# Patient Record
Sex: FEMALE | NOT HISPANIC OR LATINO | Employment: FULL TIME | URBAN - METROPOLITAN AREA
[De-identification: names, ages, dates, MRNs, and addresses within clinical notes are randomized per-mention and may not be internally consistent; named-entity substitution may affect disease eponyms.]

---

## 2023-12-30 DIAGNOSIS — Z30.41 ENCOUNTER FOR SURVEILLANCE OF CONTRACEPTIVE PILLS: Primary | ICD-10-CM

## 2024-01-02 RX ORDER — NORETHINDRONE ACETATE/ETHINYL ESTRADIOL 1MG-20MCG
1 KIT ORAL DAILY
Qty: 84 TABLET | Refills: 0 | Status: SHIPPED | OUTPATIENT
Start: 2024-01-02 | End: 2024-02-13 | Stop reason: SDUPTHER

## 2024-02-06 ENCOUNTER — APPOINTMENT (OUTPATIENT)
Dept: OBSTETRICS AND GYNECOLOGY | Facility: CLINIC | Age: 34
End: 2024-02-06
Payer: COMMERCIAL

## 2024-02-13 ENCOUNTER — OFFICE VISIT (OUTPATIENT)
Dept: OBSTETRICS AND GYNECOLOGY | Facility: CLINIC | Age: 34
End: 2024-02-13
Payer: COMMERCIAL

## 2024-02-13 VITALS
BODY MASS INDEX: 26.05 KG/M2 | HEIGHT: 63 IN | DIASTOLIC BLOOD PRESSURE: 92 MMHG | WEIGHT: 147 LBS | SYSTOLIC BLOOD PRESSURE: 128 MMHG

## 2024-02-13 DIAGNOSIS — Z01.419 WELL WOMAN EXAM WITH ROUTINE GYNECOLOGICAL EXAM: ICD-10-CM

## 2024-02-13 DIAGNOSIS — Z30.41 ENCOUNTER FOR SURVEILLANCE OF CONTRACEPTIVE PILLS: ICD-10-CM

## 2024-02-13 PROCEDURE — 88175 CYTOPATH C/V AUTO FLUID REDO: CPT

## 2024-02-13 PROCEDURE — 99395 PREV VISIT EST AGE 18-39: CPT | Performed by: OBSTETRICS & GYNECOLOGY

## 2024-02-13 PROCEDURE — 1036F TOBACCO NON-USER: CPT | Performed by: OBSTETRICS & GYNECOLOGY

## 2024-02-13 RX ORDER — NORETHINDRONE ACETATE AND ETHINYL ESTRADIOL .02; 1 MG/1; MG/1
TABLET ORAL
Qty: 28 TABLET | Refills: 15 | Status: SHIPPED | OUTPATIENT
Start: 2024-02-13

## 2024-02-13 NOTE — PROGRESS NOTES
Chief Complaint   Patient presents with    Well Women Visit     Annual exam with pap smear  - refill birth control    No complaints    Chaperone declined         Patient presents for annual preventative exam.  Overall doing well.    REVIEW OF SYSTEMS    Please see HPI for reported pertinent positives, which would supersede this ROS    Constitutional:  Denies fever, chills, wt gain or loss, fatigue    Genito-Urinary:  Denies genital lesion or sores, vaginal dryness, itching  or pain.  No abnormal vaginal discharge or unexplained vaginal bleeding.  No dysuria, urinary incontinence or frequency.  Denies pelvic pain, dysmenorrhea or dyspareunia.    Eyes:  Denies vision changes, dryness  ENT:  No hearing loss, sinus pain or congestion, nosebleeds  Cardiovascular:  No chest pain or palpitations  Respiratory:  No SOB, cough, wheezing  GI:  No Nausea, vomiting, diarrhea, constipation, abdominal pain  Musculoskeletal:  No new back pain. joint pain, peripheral edema  Skin:  No rash or skin lesion  Neurologic:  No HA, numbness or dizziness  Psychiatric:  No new anxiety or depression  Endocrine:  No loss of hair or hirsutism  Hematologic/lymphatic:  No swollen lymph nodes.  No reported tendency for easy bruising or bleeding    Patient admits to no other systemic complaints      Vitals:    02/13/24 1000   BP: (!) 128/92       PHYSICAL EXAM:    PSYCH:  Pt is alert, oriented and cooperative    SKIN: warm, dry, w/o lesion    HEAD AND FACE:  External inspection of eyes, ears, functional cranial nerves normal and intact    THYROID:  No thyromegaly    CARDIOVASCULAR:  Warm and well Perfused    PULMONARY:  No respiratory distress    ABDOMEN:  soft, nontender.  No mass or organomegaly.      BREAST:  Breasts are symmetric to inspection and palpation.  No mass palpable bilaterally.  There is no axillary lymphadenopathy    PELVIC:    External genitalia, urethra, perianal region normal to inspection and palpation if indicated.  No inguinal  LA    Vagina without lesions.    Cervix seen and visually normal      Bimanual exam:      No pelvic mass palpable    Uterus nontender, midline in pelvis    No adnexal masses or tenderness    Assessment/Plan    Diagnoses and all orders for this visit:  Well woman exam with routine gynecological exam  -     THINPREP PAP TEST  Encounter for surveillance of contraceptive pills - takes continuously    At today's visit, patient's diastolic blood pressure is 92.  We reviewed this.  We reviewed the general contraindications of estrogen containing pills, over 35 with a diagnosis of hypertension.  Discussed cardiovascular risk associated with this scenario.  That said, patient is 34 and this is her first elevated blood pressure reading.  I have asked patient to obtain a blood pressure cuff and check at home 2-3 times per week.  Call if blood pressures are greater than 140 or greater than 90.  If this is the case, can make a change to a progestin only pill.  If blood pressures are normal, continue combined hormonal contraception continuously.  -     norethindrone ac-eth estradioL (Noemy 1/20, 21,) 1-20 mg-mcg tablet; Take 1 active pill daily by mouth, skipping placebo

## 2024-02-27 LAB
CYTOLOGY CMNT CVX/VAG CYTO-IMP: NORMAL
LAB AP CONTRACEPTIVE HISTORY: NORMAL
LAB AP HPV GENOTYPE QUESTION: YES
LAB AP HPV HR: NORMAL
LABORATORY COMMENT REPORT: NORMAL
PATH REPORT.TOTAL CANCER: NORMAL

## 2024-08-20 ENCOUNTER — TELEPHONE (OUTPATIENT)
Dept: OBSTETRICS AND GYNECOLOGY | Facility: HOSPITAL | Age: 34
End: 2024-08-20
Payer: COMMERCIAL

## 2024-08-20 NOTE — TELEPHONE ENCOUNTER
Spoke with patient.    Patient takes pills continuously and is getting moderate breakthrough bleeding every few months.  Discussed managing this by coming off the continuous pills for 1 week allowing the bleeding to complete itself and then restarting.

## 2025-02-25 DIAGNOSIS — Z30.41 ENCOUNTER FOR SURVEILLANCE OF CONTRACEPTIVE PILLS: ICD-10-CM

## 2025-02-25 RX ORDER — NORETHINDRONE ACETATE AND ETHINYL ESTRADIOL .02; 1 MG/1; MG/1
TABLET ORAL
Qty: 21 TABLET | Refills: 3 | Status: SHIPPED | OUTPATIENT
Start: 2025-02-25

## 2025-03-21 ENCOUNTER — APPOINTMENT (OUTPATIENT)
Dept: OBSTETRICS AND GYNECOLOGY | Facility: CLINIC | Age: 35
End: 2025-03-21
Payer: COMMERCIAL

## 2025-03-21 VITALS
HEIGHT: 63 IN | BODY MASS INDEX: 27.82 KG/M2 | SYSTOLIC BLOOD PRESSURE: 112 MMHG | DIASTOLIC BLOOD PRESSURE: 64 MMHG | WEIGHT: 157 LBS

## 2025-03-21 DIAGNOSIS — Z30.41 ENCOUNTER FOR SURVEILLANCE OF CONTRACEPTIVE PILLS: ICD-10-CM

## 2025-03-21 DIAGNOSIS — Z01.419 WELL WOMAN EXAM WITH ROUTINE GYNECOLOGICAL EXAM: ICD-10-CM

## 2025-03-21 PROCEDURE — 99395 PREV VISIT EST AGE 18-39: CPT | Performed by: OBSTETRICS & GYNECOLOGY

## 2025-03-21 PROCEDURE — 3008F BODY MASS INDEX DOCD: CPT | Performed by: OBSTETRICS & GYNECOLOGY

## 2025-03-21 PROCEDURE — 1036F TOBACCO NON-USER: CPT | Performed by: OBSTETRICS & GYNECOLOGY

## 2025-03-21 RX ORDER — NORETHINDRONE ACETATE AND ETHINYL ESTRADIOL .02; 1 MG/1; MG/1
TABLET ORAL
Qty: 21 TABLET | Refills: 15 | Status: SHIPPED | OUTPATIENT
Start: 2025-03-21

## 2025-03-21 NOTE — PROGRESS NOTES
Chief Complaint   Patient presents with    Well Women Visit     Annual exam with pap smear   - refill birth control (takes continuously)    No complaints    Chaperone declined     Overall patient doing well.  In the past year, has experienced more breakthrough bleeding and in the past.  She is on a 20 mcg pill and taking this continuously without a break.  Discussed changing to either a higher dose estrogen pill or taking a week off from her 20 mcg pill if breakthrough bleeding significant.    REVIEW OF SYSTEMS    Please see HPI for reported pertinent positives, which would supersede this ROS    Constitutional:  Denies fever, chills, wt gain or loss, fatigue    Genito-Urinary:  Denies genital lesion or sores, vaginal dryness, itching  or pain.  No abnormal vaginal discharge or unexplained vaginal bleeding.  No dysuria, urinary incontinence or frequency.  Denies pelvic pain, dysmenorrhea or dyspareunia.    Eyes:  Denies vision changes, dryness  ENT:  No hearing loss, sinus pain or congestion, nosebleeds  Cardiovascular:  No chest pain or palpitations  Respiratory:  No SOB, cough, wheezing  GI:  No Nausea, vomiting, diarrhea, constipation, abdominal pain  Musculoskeletal:  No new back pain. joint pain, peripheral edema  Skin:  No rash or skin lesion  Neurologic:  No HA, numbness or dizziness  Psychiatric:  No new anxiety or depression  Endocrine:  No loss of hair or hirsutism  Hematologic/lymphatic:  No swollen lymph nodes.  No reported tendency for easy bruising or bleeding    Patient admits to no other systemic complaints      Vitals:    03/21/25 1046   BP: 112/64       PHYSICAL EXAM:    PSYCH:  Pt is alert, oriented and cooperative    SKIN: warm, dry, w/o lesion    HEAD AND FACE:  External inspection of eyes, ears, functional cranial nerves normal and intact    THYROID:  No thyromegaly    CARDIOVASCULAR:  Warm and well Perfused    PULMONARY:  No respiratory distress    ABDOMEN:  soft, nontender.  No mass or  organomegaly.      BREAST:  Breasts are symmetric to inspection and palpation.  No mass palpable bilaterally.  There is no axillary lymphadenopathy    PELVIC:    External genitalia, urethra, perianal region normal to inspection and palpation if indicated.  No inguinal LA    Vagina without lesions.    Cervix seen and visually normal      Bimanual exam:      No pelvic mass palpable    Uterus nontender, midline in pelvis    No adnexal masses or tenderness    Assessment/Plan    Diagnoses and all orders for this visit:  Well woman exam with routine gynecological exam  -     THINPREP PAP TEST  Encounter for surveillance of contraceptive pills  -     norethindrone ac-eth estradioL (Noemy 1/20, 21,) 1-20 mg-mcg tablet; TAKE ONE ACTIVE TABLET BY MOUTH EVERY DAY

## 2025-04-08 LAB
CYTOLOGY CMNT CVX/VAG CYTO-IMP: NORMAL
HPV HR 12 DNA GENITAL QL NAA+PROBE: NEGATIVE
HPV HR GENOTYPES PNL CVX NAA+PROBE: NEGATIVE
HPV16 DNA SPEC QL NAA+PROBE: NEGATIVE
HPV18 DNA SPEC QL NAA+PROBE: NEGATIVE
LAB AP CONTRACEPTIVE HISTORY: NORMAL
LAB AP HPV GENOTYPE QUESTION: YES
LAB AP HPV HR: NORMAL
LABORATORY COMMENT REPORT: NORMAL
MENSTRUAL HX REPORTED: NORMAL
PATH REPORT.TOTAL CANCER: NORMAL